# Patient Record
Sex: MALE | URBAN - METROPOLITAN AREA
[De-identification: names, ages, dates, MRNs, and addresses within clinical notes are randomized per-mention and may not be internally consistent; named-entity substitution may affect disease eponyms.]

---

## 2021-12-12 ENCOUNTER — OFFICE VISIT (OUTPATIENT)
Dept: FAMILY MEDICINE CLINIC | Facility: CLINIC | Age: 61
End: 2021-12-12
Payer: COMMERCIAL

## 2021-12-12 VITALS
BODY MASS INDEX: 41.75 KG/M2 | HEART RATE: 59 BPM | OXYGEN SATURATION: 99 % | WEIGHT: 315 LBS | SYSTOLIC BLOOD PRESSURE: 134 MMHG | RESPIRATION RATE: 20 BRPM | HEIGHT: 73 IN | DIASTOLIC BLOOD PRESSURE: 64 MMHG | TEMPERATURE: 98 F

## 2021-12-12 DIAGNOSIS — L03.115 CELLULITIS OF LEG, RIGHT: Primary | ICD-10-CM

## 2021-12-12 PROCEDURE — 3008F BODY MASS INDEX DOCD: CPT | Performed by: NURSE PRACTITIONER

## 2021-12-12 PROCEDURE — 3078F DIAST BP <80 MM HG: CPT | Performed by: NURSE PRACTITIONER

## 2021-12-12 PROCEDURE — 3075F SYST BP GE 130 - 139MM HG: CPT | Performed by: NURSE PRACTITIONER

## 2021-12-12 PROCEDURE — 99202 OFFICE O/P NEW SF 15 MIN: CPT | Performed by: NURSE PRACTITIONER

## 2021-12-12 RX ORDER — PRAVASTATIN SODIUM 20 MG
1 TABLET ORAL AS DIRECTED
COMMUNITY
Start: 2020-12-11

## 2021-12-12 RX ORDER — PIOGLITAZONEHYDROCHLORIDE 45 MG/1
45 TABLET ORAL DAILY
COMMUNITY
Start: 2020-08-31

## 2021-12-12 RX ORDER — SULFAMETHOXAZOLE AND TRIMETHOPRIM 800; 160 MG/1; MG/1
1 TABLET ORAL 2 TIMES DAILY
Qty: 20 TABLET | Refills: 0 | Status: SHIPPED | OUTPATIENT
Start: 2021-12-12 | End: 2021-12-22

## 2021-12-12 RX ORDER — AMLODIPINE BESYLATE 10 MG/1
10 TABLET ORAL DAILY
COMMUNITY
Start: 2021-11-08

## 2021-12-12 NOTE — PROGRESS NOTES
Right leg cellulitis today    CHIEF COMPLAINT:     Patient presents with:  Skin Problem      HPI:   Ayaka Brown is a 62year old male who presents with complaints of right leg redness and pain due to cellulitis.  Pt reports having cellulitis in the p was seen today for skin problem. Diagnoses and all orders for this visit:    Cellulitis of leg, right  -     sulfamethoxazole-trimethoprim DS (BACTRIM DS) 800-160 MG Oral Tab per tablet; Take 1 tablet by mouth 2 (two) times daily for 10 days.       PLAN: above or below the infected area  · Discharge or pus draining from the area  · Fever of 100.4°F (38°C) or higher, or as directed by your healthcare provider  · Pain that gets worse in or around the infected   · Redness that gets worse in or around the infe

## 2021-12-12 NOTE — PATIENT INSTRUCTIONS
Use the antibiotic   Keep the leg elevated  Compression socks  Call PCP if the symptoms get worse. Discharge Instructions for Cellulitis   You have been diagnosed with cellulitis.  This is an infection in the deepest layer of the skin and tissue aida the area of redness expands to a wider area  · Shaking chills  · Swelling of the infected area  · Vomiting  Alonso last reviewed this educational content on 11/1/2019  © 1177-2769 [employer]. All rights reserved.  This information is not in